# Patient Record
Sex: MALE | NOT HISPANIC OR LATINO | URBAN - METROPOLITAN AREA
[De-identification: names, ages, dates, MRNs, and addresses within clinical notes are randomized per-mention and may not be internally consistent; named-entity substitution may affect disease eponyms.]

---

## 2024-07-30 ENCOUNTER — CLINICAL SUPPORT (OUTPATIENT)
Dept: AUDIOLOGY | Facility: CLINIC | Age: 74
End: 2024-07-30
Payer: OTHER GOVERNMENT

## 2024-07-30 DIAGNOSIS — H61.23 BILATERAL IMPACTED CERUMEN: Primary | ICD-10-CM

## 2024-07-30 PROCEDURE — 99202 OFFICE O/P NEW SF 15 MIN: CPT | Mod: PBBFAC | Performed by: AUDIOLOGIST

## 2024-07-30 PROCEDURE — 99999 PR PBB SHADOW E&M-NEW PATIENT-LVL II: CPT | Mod: PBBFAC,,, | Performed by: AUDIOLOGIST

## 2024-07-30 NOTE — PROGRESS NOTES
Mateus Castillo was seen 07/30/2024 for an audiological evaluation. Patient complains of bilateral gradual hearing loss with the right ear being the poorer hearing ear. He denies any tinnitus or dizziness. 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     Otoscopy revealed bilateral cerumen impactions.    Patient was counseled on the above findings.    Recommendations:  Follow-up with ENT, for cerumen removal.   Audiogram once ears are clear

## 2024-08-29 ENCOUNTER — OFFICE VISIT (OUTPATIENT)
Dept: OTOLARYNGOLOGY | Facility: CLINIC | Age: 74
End: 2024-08-29
Payer: OTHER GOVERNMENT

## 2024-08-29 ENCOUNTER — CLINICAL SUPPORT (OUTPATIENT)
Dept: AUDIOLOGY | Facility: CLINIC | Age: 74
End: 2024-08-29
Payer: OTHER GOVERNMENT

## 2024-08-29 DIAGNOSIS — H90.3 SENSORINEURAL HEARING LOSS (SNHL), BILATERAL: ICD-10-CM

## 2024-08-29 DIAGNOSIS — H61.23 BILATERAL IMPACTED CERUMEN: Primary | ICD-10-CM

## 2024-08-29 DIAGNOSIS — H90.3 HEARING LOSS, SENSORINEURAL, ASYMMETRICAL: Primary | ICD-10-CM

## 2024-08-29 PROCEDURE — 99999 PR PBB SHADOW E&M-EST. PATIENT-LVL II: CPT | Mod: PBBFAC,,, | Performed by: STUDENT IN AN ORGANIZED HEALTH CARE EDUCATION/TRAINING PROGRAM

## 2024-08-29 PROCEDURE — 99999 PR PBB SHADOW E&M-EST. PATIENT-LVL I: CPT | Mod: PBBFAC,,, | Performed by: AUDIOLOGIST

## 2024-08-29 PROCEDURE — 99204 OFFICE O/P NEW MOD 45 MIN: CPT | Mod: S$PBB,,, | Performed by: STUDENT IN AN ORGANIZED HEALTH CARE EDUCATION/TRAINING PROGRAM

## 2024-08-29 PROCEDURE — 92567 TYMPANOMETRY: CPT | Mod: PBBFAC | Performed by: AUDIOLOGIST

## 2024-08-29 PROCEDURE — 99211 OFF/OP EST MAY X REQ PHY/QHP: CPT | Mod: PBBFAC,27 | Performed by: AUDIOLOGIST

## 2024-08-29 PROCEDURE — 99212 OFFICE O/P EST SF 10 MIN: CPT | Mod: PBBFAC | Performed by: STUDENT IN AN ORGANIZED HEALTH CARE EDUCATION/TRAINING PROGRAM

## 2024-08-29 PROCEDURE — 92557 COMPREHENSIVE HEARING TEST: CPT | Mod: PBBFAC | Performed by: AUDIOLOGIST

## 2024-08-29 NOTE — PROGRESS NOTES
Chief complaint:   Chief Complaint   Patient presents with    Cerumen Impaction    Hearing Loss          Referring Provider:  [facility]  [address] [place] [address]    History of Present Illness:     Mr. Castillo is a 74 y.o. male presenting for evaluation of hearing loss.     He describes a equally, bilateral sided hearing loss starting many years ago and has been progressively worsening for a many years.    The patient reports the following risk factors for hearing loss (Negative unless checked off):   [] Familial deafness   [] Ototoxic medication exposure  [] Acoustic trauma  []  Occupational exposure  [] Head injury or trauma  [] Otologic infection  [] History of meningitis  [] Ear surgery (other than pediatric tympanostomy tubes)  [] Metabolic disease      Severity: moderate  Quality: muffled  Modifying factors:  None  Associated symptoms include   [] Vertigo  [] Tinnitus  [] Otalgia  [] Drainage or pain   Previous treatments include none.      History        Past Medical History: No past medical history on file. .          Past Surgical History:No past surgical history on file..         Medications: Medication list was reviewed. He  currently has no medications in their medication list.         Allergies: Review of patient's allergies indicates:  No Known Allergies         Family history: family history is not on file.         Social History          Alcohol use:  has no history on file for alcohol use.            Tobacco:  reports that he has never smoked. He has never used smokeless tobacco.         Please see the patient's intake form for full details of past medical history, past surgical history, family history, social history and review of systems. ?This information was reviewed by me and noted.      Physical Examination     General: Well developed, well nourished, well hydrated. Verbal with a strong voice and not dysphonic.     Head/Face: Normocephalic,  atraumatic. No scars or lesions. Facial musculature equal.     Eyes: No scleral icterus or conjunctival hemorrhage. EOMI. PERRLA.     Ears: after procedure    Right ear: No gross deformity. EAC is clear of debris and erythema. The TM is intact with a pneumatized middle ear. No signs of retraction, fluid or infection.      Left ear: No gross deformity. EAC is clear of debris and erythema. The TM is intact with a pneumatized middle ear. No signs of retraction, fluid or infection.     Nose: No gross deformity or lesions. No purulent discharge. No significant NSD.      Mouth/Oropharynx: Lips without any lesions. No mucosal lesions within the oropharynx. No tonsillar exudate or lesions. Pharyngeal walls symmetrical. Uvula midline. Tongue midline without lesions.     Neck: Trachea midline. No masses. No thyromegaly or nodules palpated.     Lymphatic: No lymphadenopathy in the neck.     Extremities: No cyanosis. Warm and well-perfused.     Skin: No scars or lesions on face or neck.      Neurologic: Moving all extremities without gross abnormality.CN II-XII grossly intact. House-Brackmann 1/6. No signs of nystagmus.      Psych: Alert and oriented to person, place, and time with an appropriate mood and affect.       Data Review    Review of records:      I reviewed records from the referring provider's office visits including the history, workup, and/or treatment of this problem thus far.    Audiogram     Audiogram was independently reviewed:    Mild slopping to moderate Sensorineural Hearing Loss AU  Excellent word rec AU      Procedure: ear microscopy with removal of cerumen    Description: The patient was in agreement with the examination and debridement of the ears. Removal of the cerumen required use of an operating microscope and multiple micro-instruments.     With the patient in the supine position, we used the operating microscope to examine both ears with the appropriate sized ear speculum.  A variety of sterile,  micro-instruments were utilized to remove the cerumen atraumatically.  I performed the procedure which required a significant amount of time and effort. The tympanic membrane was then well visualized.  The patient tolerated the procedure well and there were no complications.    Findings:   Right ear had significant wax, the EAC was normal, and the tympanic membrane was intact with no evidence of middle ear fluid.    Left ear had significant wax, the EAC was normal, and the tympanic membrane was intact with no evidence of middle ear fluid.         Assessment/Plan:    1. Bilateral impacted cerumen    2. Sensorineural hearing loss (SNHL), bilateral         I spent a considerable amount of time educating the patient on hearing and hearing loss.  We discussed the basic characteristics of conductive hearing loss versus sensorineural hearing loss and the significant differences in treatment options between the two categories.      We discussed that in cases of conductive hearing loss, this suggests a mechanical disorder that sometimes can be improved with medications &/or surgery.  It may occur secondary to external ear pathology (atresia, otitis externa, etc), tympanic membrane disorders (large perforations, immobility due to scarring or eustachian tube dysfunction), and middle ear disorders (effusions, ossicular disorders).    Sensorineural hearing loss is the expected hearing loss pattern with aging, but some disorders such as Meniere's may accelerate this process.  Additionally, amplification with hearing aids is generally the best option for hearing rehabilitation, except where the hearing loss is profound.  We discussed that this generally does not represent a dangerous condition, but in cases where there is a large discrepancy between the two ears in terms of nerve function, more investigation is often necessary due to the possiblity of vestibular schwannomas or meningiomas at the cerebellopontine angle.  The  definitive test for this is an MRI with gadolinium.  However, these masses are usually very slow growing (1-2mm/year), so patients may elect to repeat an audiogram in about 6 months or obtain an ABR so long as they understand that this may result in a delay in diagnosis (although very unlikely that this would have a significant clinical impact on their outcome due to the slow growing nature of these masses) with the understanding that if ABR is abnormal or asymmetry increases, an MRI would then be required.    Mateus  presents with what appears to be sensorineural hearing loss.  He is cleared for hearing aids, when motivated.    Cerumen Impaction  We discussed preventative measures and treatment options.  Q-tips must be avoided, instead the ears can be cleaned with OTC ear rinses (or mineral oil).  If the cerumen impacts the ear canal and causes hearing loss or infection he needs to follow-up in the clinic for treatment and cleaning.              Kieran Villanueva MD  [facility]   [facility] - [address]  [place] [address]  P: [phone]  F: [phone]

## 2024-08-29 NOTE — PROGRESS NOTES
Mateus Castillo was seen 08/29/2024 for an audiological evaluation. Patient complains of bilateral gradual hearing loss with the right ear being the poorer hearing ear. He denies any tinnitus or dizziness. 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     Ears cleaned by ENT prior to testing. He reports significant improvement in hearing since ear cleaning.    Results reveal a mild-to-moderate sensorineural hearing loss 250-8000 Hz for the right ear, and  mild-to-moderate sensorineural hearing loss 7086-0367 Hz for the left ear.   Speech Reception Thresholds were  20 dBHL for the right ear and 25 dBHL for the left ear.   Word recognition scores were excellent for the right ear and excellent for the left ear.  Tympanograms were Type B, abnormal for the right ear and Type B, abnormal for the left ear.    Patient was counseled on the above findings.    Recommendations:  Follow-up with ENT, as scheduled.   Repeat audiological evaluation in 1-2 years to monitor hearing, or sooner if needed.  [facility]   Wear hearing protection devices when around loud noise.